# Patient Record
Sex: MALE | Race: OTHER | HISPANIC OR LATINO | Employment: PART TIME | URBAN - METROPOLITAN AREA
[De-identification: names, ages, dates, MRNs, and addresses within clinical notes are randomized per-mention and may not be internally consistent; named-entity substitution may affect disease eponyms.]

---

## 2020-08-18 ENCOUNTER — APPOINTMENT (OUTPATIENT)
Dept: LAB | Facility: HOSPITAL | Age: 35
End: 2020-08-18
Payer: COMMERCIAL

## 2020-08-18 ENCOUNTER — OFFICE VISIT (OUTPATIENT)
Dept: FAMILY MEDICINE CLINIC | Facility: CLINIC | Age: 35
End: 2020-08-18
Payer: COMMERCIAL

## 2020-08-18 VITALS
TEMPERATURE: 97.2 F | BODY MASS INDEX: 26.34 KG/M2 | SYSTOLIC BLOOD PRESSURE: 118 MMHG | HEIGHT: 70 IN | HEART RATE: 82 BPM | DIASTOLIC BLOOD PRESSURE: 70 MMHG | OXYGEN SATURATION: 97 % | RESPIRATION RATE: 17 BRPM | WEIGHT: 184 LBS

## 2020-08-18 DIAGNOSIS — Z02.1 PRE-EMPLOYMENT HEALTH SCREENING EXAMINATION: ICD-10-CM

## 2020-08-18 DIAGNOSIS — Z02.1 PRE-EMPLOYMENT HEALTH SCREENING EXAMINATION: Primary | ICD-10-CM

## 2020-08-18 PROCEDURE — 36415 COLL VENOUS BLD VENIPUNCTURE: CPT

## 2020-08-18 PROCEDURE — 86480 TB TEST CELL IMMUN MEASURE: CPT

## 2020-08-18 PROCEDURE — 3008F BODY MASS INDEX DOCD: CPT | Performed by: INTERNAL MEDICINE

## 2020-08-18 PROCEDURE — 1036F TOBACCO NON-USER: CPT | Performed by: INTERNAL MEDICINE

## 2020-08-18 PROCEDURE — 99204 OFFICE O/P NEW MOD 45 MIN: CPT | Performed by: INTERNAL MEDICINE

## 2020-08-18 PROCEDURE — 86787 VARICELLA-ZOSTER ANTIBODY: CPT

## 2020-08-20 LAB
GAMMA INTERFERON BACKGROUND BLD IA-ACNC: 0.04 IU/ML
M TB IFN-G BLD-IMP: NEGATIVE
M TB IFN-G CD4+ BCKGRND COR BLD-ACNC: 0.08 IU/ML
M TB IFN-G CD4+ BCKGRND COR BLD-ACNC: 0.12 IU/ML
MITOGEN IGNF BCKGRD COR BLD-ACNC: >10 IU/ML
VZV IGG SER IA-ACNC: NORMAL

## 2020-08-31 ENCOUNTER — TELEPHONE (OUTPATIENT)
Dept: FAMILY MEDICINE CLINIC | Facility: CLINIC | Age: 35
End: 2020-08-31

## 2021-09-25 ENCOUNTER — IMMUNIZATIONS (OUTPATIENT)
Dept: FAMILY MEDICINE CLINIC | Facility: HOSPITAL | Age: 36
End: 2021-09-25

## 2021-09-25 DIAGNOSIS — Z23 ENCOUNTER FOR IMMUNIZATION: Primary | ICD-10-CM

## 2021-09-25 PROCEDURE — 91300 SARS-COV-2 / COVID-19 MRNA VACCINE (PFIZER-BIONTECH) 30 MCG: CPT

## 2021-09-25 PROCEDURE — 0001A SARS-COV-2 / COVID-19 MRNA VACCINE (PFIZER-BIONTECH) 30 MCG: CPT

## 2021-12-08 ENCOUNTER — CONSULT (OUTPATIENT)
Dept: PULMONOLOGY | Facility: CLINIC | Age: 36
End: 2021-12-08
Payer: COMMERCIAL

## 2021-12-08 VITALS
HEART RATE: 78 BPM | DIASTOLIC BLOOD PRESSURE: 78 MMHG | WEIGHT: 176.25 LBS | OXYGEN SATURATION: 98 % | SYSTOLIC BLOOD PRESSURE: 104 MMHG | HEIGHT: 70 IN | BODY MASS INDEX: 25.23 KG/M2 | TEMPERATURE: 97.6 F

## 2021-12-08 DIAGNOSIS — J45.20 MILD INTERMITTENT ASTHMA WITHOUT COMPLICATION: Primary | ICD-10-CM

## 2021-12-08 PROCEDURE — 99244 OFF/OP CNSLTJ NEW/EST MOD 40: CPT | Performed by: INTERNAL MEDICINE

## 2021-12-08 RX ORDER — ALBUTEROL SULFATE 90 UG/1
2 AEROSOL, METERED RESPIRATORY (INHALATION) EVERY 6 HOURS PRN
COMMUNITY

## 2021-12-30 ENCOUNTER — TELEPHONE (OUTPATIENT)
Dept: FAMILY MEDICINE CLINIC | Facility: CLINIC | Age: 36
End: 2021-12-30

## 2021-12-30 DIAGNOSIS — R68.83 CHILLS: ICD-10-CM

## 2021-12-30 DIAGNOSIS — R52 BODY ACHES: ICD-10-CM

## 2021-12-30 DIAGNOSIS — Z20.822 EXPOSURE TO COVID-19 VIRUS: ICD-10-CM

## 2021-12-30 DIAGNOSIS — U07.1 COVID-19: ICD-10-CM

## 2021-12-30 DIAGNOSIS — J02.9 SORETHROAT: Primary | ICD-10-CM

## 2021-12-30 DIAGNOSIS — R05.9 COUGH: ICD-10-CM

## 2022-01-05 ENCOUNTER — TELEPHONE (OUTPATIENT)
Dept: FAMILY MEDICINE CLINIC | Facility: CLINIC | Age: 37
End: 2022-01-05

## 2022-01-05 NOTE — TELEPHONE ENCOUNTER
Pt left a VM stating he is positive for covid  But Pt was tested at a [facility] so we are aware

## 2022-02-01 ENCOUNTER — HOSPITAL ENCOUNTER (OUTPATIENT)
Dept: PULMONOLOGY | Facility: HOSPITAL | Age: 37
Discharge: HOME | End: 2022-02-01
Attending: INTERNAL MEDICINE
Payer: COMMERCIAL

## 2022-02-01 DIAGNOSIS — J45.20 MILD INTERMITTENT ASTHMA WITHOUT COMPLICATION: ICD-10-CM

## 2022-02-01 PROCEDURE — 94060 EVALUATION OF WHEEZING: CPT

## 2022-02-01 PROCEDURE — 94060 EVALUATION OF WHEEZING: CPT | Performed by: INTERNAL MEDICINE

## 2022-02-01 PROCEDURE — 94726 PLETHYSMOGRAPHY LUNG VOLUMES: CPT | Performed by: INTERNAL MEDICINE

## 2022-02-01 PROCEDURE — 94729 DIFFUSING CAPACITY: CPT | Performed by: INTERNAL MEDICINE

## 2022-02-01 PROCEDURE — 94729 DIFFUSING CAPACITY: CPT

## 2022-02-01 PROCEDURE — 94726 PLETHYSMOGRAPHY LUNG VOLUMES: CPT

## 2022-02-01 PROCEDURE — 94760 N-INVAS EAR/PLS OXIMETRY 1: CPT

## 2022-02-01 RX ORDER — ALBUTEROL SULFATE 2.5 MG/3ML
SOLUTION RESPIRATORY (INHALATION)
Status: COMPLETED
Start: 2022-02-01 | End: 2022-02-01

## 2022-02-01 RX ORDER — ALBUTEROL SULFATE 2.5 MG/3ML
2.5 SOLUTION RESPIRATORY (INHALATION) ONCE
Status: COMPLETED | OUTPATIENT
Start: 2022-02-01 | End: 2022-02-01

## 2022-02-01 RX ADMIN — ALBUTEROL SULFATE 2.5 MG: 2.5 SOLUTION RESPIRATORY (INHALATION) at 08:36

## 2022-02-16 DIAGNOSIS — J45.20 MILD INTERMITTENT ASTHMA WITHOUT COMPLICATION: Primary | ICD-10-CM

## 2022-03-07 ENCOUNTER — OFFICE VISIT (OUTPATIENT)
Dept: FAMILY MEDICINE CLINIC | Facility: CLINIC | Age: 37
End: 2022-03-07
Payer: COMMERCIAL

## 2022-03-07 VITALS
TEMPERATURE: 98.2 F | HEIGHT: 70 IN | HEART RATE: 78 BPM | WEIGHT: 180 LBS | OXYGEN SATURATION: 97 % | BODY MASS INDEX: 25.77 KG/M2 | DIASTOLIC BLOOD PRESSURE: 62 MMHG | RESPIRATION RATE: 16 BRPM | SYSTOLIC BLOOD PRESSURE: 116 MMHG

## 2022-03-07 DIAGNOSIS — Z01.84 ENCOUNTER FOR IMMUNOLOGICAL TEST: ICD-10-CM

## 2022-03-07 DIAGNOSIS — Z00.00 ANNUAL PHYSICAL EXAM: Primary | ICD-10-CM

## 2022-03-07 DIAGNOSIS — E66.3 OVERWEIGHT WITH BODY MASS INDEX (BMI) OF 25 TO 25.9 IN ADULT: ICD-10-CM

## 2022-03-07 DIAGNOSIS — Z02.1 PRE-EMPLOYMENT HEALTH SCREENING EXAMINATION: ICD-10-CM

## 2022-03-07 PROCEDURE — 99395 PREV VISIT EST AGE 18-39: CPT | Performed by: FAMILY MEDICINE

## 2022-03-07 PROCEDURE — 3725F SCREEN DEPRESSION PERFORMED: CPT | Performed by: FAMILY MEDICINE

## 2022-03-07 PROCEDURE — 1036F TOBACCO NON-USER: CPT | Performed by: FAMILY MEDICINE

## 2022-03-07 PROCEDURE — 3008F BODY MASS INDEX DOCD: CPT | Performed by: FAMILY MEDICINE

## 2022-03-07 PROCEDURE — 87070 CULTURE OTHR SPECIMN AEROBIC: CPT | Performed by: FAMILY MEDICINE

## 2022-03-07 NOTE — PROGRESS NOTES
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 [place] which is ordered  Free of communicable diseases  History of chicken pox , varicella titres immune in 2020, no repeat testing indicated  Immunizations and preventive care screenings were discussed with patient today  Appropriate education was printed on patient's after visit summary  Counseling:  Alcohol/drug use: discussed moderation in alcohol intake, the recommendations for healthy alcohol use, and avoidance of illicit drug use  Dental Health: discussed importance of regular tooth brushing, flossing, and dental visits  Injury prevention: discussed safety/seat belts, safety helmets, smoke detectors, carbon dioxide detectors, and smoking near bedding or upholstery  · Exercise: the importance of regular exercise/physical activity was discussed  Recommend exercise 3-5 times per week for at least 30 minutes  BMI Counseling: Body mass index is 25 83 kg/m²  The BMI is above normal  Nutrition recommendations include decreasing portion sizes, encouraging healthy choices of fruits and vegetables, decreasing fast food intake, consuming healthier snacks, limiting drinks that contain sugar, moderation in carbohydrate intake and reducing intake of cholesterol  Exercise recommendations include moderate physical activity 150 minutes/week  No pharmacotherapy was ordered  Rationale for BMI follow-up plan is due to patient being overweight or obese  Depression Screening and Follow-up Plan: Patient was screened for depression during today's encounter  They screened negative with a PHQ-2 score of 2  No follow-ups on file  Chief Complaint:     Chief Complaint   Patient presents with    Physical Exam      History of Present Illness:     Adult Annual Physical   Patient here for a comprehensive physical exam  The patient reports no problems  Diet and Physical Activity  · Diet/Nutrition: well balanced diet, limited junk food and consuming 3-5 servings of fruits/vegetables daily  · Exercise: moderate cardiovascular exercise  Depression Screening  PHQ-2/9 Depression Screening    Little interest or pleasure in doing things: 2 - more than half the days  Feeling down, depressed, or hopeless: 0 - not at all  PHQ-2 Score: 2  PHQ-2 Interpretation: Negative depression screen       General Health  · Sleep: sleeps well  · Hearing: grossly normal   · Vision: no vision problems and goes for regular eye exams  · Dental: regular dental visits  [facility]  · History of STDs?: no      Review of Systems:     Review of Systems   Constitutional: Negative for chills and fever  HENT: Negative for congestion, rhinorrhea and sore throat  Eyes: Negative for discharge, redness and itching  Respiratory: Negative for chest tightness, shortness of breath and wheezing  Cardiovascular: Negative for chest pain and palpitations  Gastrointestinal: Negative for abdominal pain, constipation and diarrhea  Genitourinary: Negative for dysuria  Skin: Negative for pallor and rash  Neurological: Negative for dizziness, weakness, numbness and headaches  Past Medical History:     Past Medical History:   Diagnosis Date    Asthma     Last episode was 2009, when I was in college  Past Surgical History:     History reviewed  No pertinent surgical history     Social History: Social History     Socioeconomic History    Marital status: /Civil Union     Spouse name: None    Number of children: None    Years of education: None    Highest education level: None   Occupational History    None   Tobacco Use    Smoking status: Never Smoker    Smokeless tobacco: Never Used   Vaping Use    Vaping Use: Never used   Substance and Sexual Activity    Alcohol use: Never     Alcohol/week: 1 0 standard drink     Types: 1 Cans of beer per week     Comment: soc    Drug use: Never    Sexual activity: Yes     Partners: Female     Birth control/protection: I U D  Other Topics Concern    None   Social History Narrative    None     Social Determinants of Health     Financial Resource Strain: Not on file   Food Insecurity: Not on file   Transportation Needs: Not on file   Physical Activity: Not on file   Stress: Not on file   Social Connections: Not on file   Intimate Partner Violence: Not on file   Housing Stability: Not on file      Family History:     Family History   Problem Relation Age of Onset    Hypertension Mother     Diabetes Father         Diabetes      Current Medications:     Current Outpatient Medications   Medication Sig Dispense Refill    albuterol (PROVENTIL HFA,VENTOLIN HFA) 90 mcg/act inhaler Inhale 2 puffs every 6 (six) hours as needed for wheezing       No current facility-administered medications for this visit  Allergies:     No Known Allergies   Physical Exam:     /62 (BP Location: Left arm, Patient Position: Sitting, Cuff Size: Adult)   Pulse 78   Temp 98 2 °F (36 8 °C) (Temporal)   Resp 16   Ht 5' 10" (1 778 m)   Wt 81 6 kg (180 lb)   SpO2 97%   BMI 25 83 kg/m²     Physical Exam  Vitals and nursing note reviewed  Constitutional:       General: He is not in acute distress  Appearance: Normal appearance  He is well-developed and normal weight  He is not ill-appearing or toxic-appearing  HENT:      Head: Normocephalic and atraumatic  Right Ear: Tympanic membrane, ear canal and external ear normal       Left Ear: Tympanic membrane, ear canal and external ear normal       Nose: No mucosal edema or rhinorrhea  Mouth/Throat:      Mouth: Mucous membranes are not pale, dry and not cyanotic  Pharynx: Oropharynx is clear  No oropharyngeal exudate or posterior oropharyngeal erythema  Eyes:      General: No scleral icterus  Right eye: No discharge  Left eye: No discharge  Extraocular Movements: Extraocular movements intact  Conjunctiva/sclera: Conjunctivae normal       Pupils: Pupils are equal, round, and reactive to light  Cardiovascular:      Rate and Rhythm: Normal rate and regular rhythm  Heart sounds: Normal heart sounds  No murmur heard  No gallop  Pulmonary:      Effort: Pulmonary effort is normal  No respiratory distress  Breath sounds: Normal breath sounds  No wheezing or rales  Abdominal:      General: Abdomen is flat  Palpations: Abdomen is soft  Tenderness: There is no abdominal tenderness  Musculoskeletal:         General: No swelling, tenderness, deformity or signs of injury  Cervical back: Normal range of motion  Right lower leg: No edema  Left lower leg: No edema  Skin:     General: Skin is warm  Coloration: Skin is not jaundiced or pale  Findings: No rash  Neurological:      General: No focal deficit present  Mental Status: He is alert and oriented to person, place, and time  Psychiatric:         Mood and Affect: Mood normal          Behavior: Behavior normal          Thought Content:  Thought content normal          Judgment: Judgment normal           Geeta Bruno MD   [address] [place] [address]

## 2022-03-07 NOTE — PATIENT INSTRUCTIONS

## 2022-03-10 LAB — BACTERIA THROAT CULT: NORMAL

## 2022-05-24 ENCOUNTER — OFFICE VISIT (OUTPATIENT)
Dept: FAMILY MEDICINE CLINIC | Facility: CLINIC | Age: 37
End: 2022-05-24
Payer: COMMERCIAL

## 2022-05-24 ENCOUNTER — APPOINTMENT (OUTPATIENT)
Dept: LAB | Facility: HOSPITAL | Age: 37
End: 2022-05-24
Payer: COMMERCIAL

## 2022-05-24 VITALS
RESPIRATION RATE: 16 BRPM | TEMPERATURE: 102.5 F | DIASTOLIC BLOOD PRESSURE: 60 MMHG | HEIGHT: 70 IN | BODY MASS INDEX: 27.23 KG/M2 | OXYGEN SATURATION: 98 % | HEART RATE: 114 BPM | SYSTOLIC BLOOD PRESSURE: 108 MMHG | WEIGHT: 190.2 LBS

## 2022-05-24 DIAGNOSIS — J35.1 SWOLLEN TONSIL: ICD-10-CM

## 2022-05-24 DIAGNOSIS — A92.9: ICD-10-CM

## 2022-05-24 DIAGNOSIS — A92.9: Primary | ICD-10-CM

## 2022-05-24 DIAGNOSIS — T63.624A: ICD-10-CM

## 2022-05-24 DIAGNOSIS — Z13.89 SCREENING FOR BLOOD OR PROTEIN IN URINE: ICD-10-CM

## 2022-05-24 DIAGNOSIS — B34.9 VIRAL INFECTION, UNSPECIFIED: ICD-10-CM

## 2022-05-24 PROBLEM — T63.621A: Status: ACTIVE | Noted: 2022-05-24

## 2022-05-24 LAB
ALBUMIN SERPL BCP-MCNC: 4.1 G/DL (ref 3.5–5)
ALP SERPL-CCNC: 47 U/L (ref 34–104)
ALT SERPL W P-5'-P-CCNC: 14 U/L (ref 7–52)
ANION GAP SERPL CALCULATED.3IONS-SCNC: 7 MMOL/L (ref 4–13)
AST SERPL W P-5'-P-CCNC: 18 U/L (ref 13–39)
BASOPHILS # BLD AUTO: 0.02 THOUSANDS/ΜL (ref 0–0.1)
BASOPHILS NFR BLD AUTO: 0 % (ref 0–1)
BILIRUB SERPL-MCNC: 0.56 MG/DL (ref 0.2–1)
BUN SERPL-MCNC: 12 MG/DL (ref 5–25)
CALCIUM SERPL-MCNC: 9 MG/DL (ref 8.4–10.2)
CHLORIDE SERPL-SCNC: 99 MMOL/L (ref 96–108)
CO2 SERPL-SCNC: 30 MMOL/L (ref 21–32)
CREAT SERPL-MCNC: 1.08 MG/DL (ref 0.6–1.3)
EOSINOPHIL # BLD AUTO: 0.03 THOUSAND/ΜL (ref 0–0.61)
EOSINOPHIL NFR BLD AUTO: 0 % (ref 0–6)
ERYTHROCYTE [DISTWIDTH] IN BLOOD BY AUTOMATED COUNT: 12.2 % (ref 11.6–15.1)
GFR SERPL CREATININE-BSD FRML MDRD: 88 ML/MIN/1.73SQ M
GLUCOSE P FAST SERPL-MCNC: 125 MG/DL (ref 65–99)
HCT VFR BLD AUTO: 42.8 % (ref 36.5–49.3)
HGB BLD-MCNC: 14.8 G/DL (ref 12–17)
IMM GRANULOCYTES # BLD AUTO: 0.03 THOUSAND/UL (ref 0–0.2)
IMM GRANULOCYTES NFR BLD AUTO: 0 % (ref 0–2)
LYMPHOCYTES # BLD AUTO: 0.36 THOUSANDS/ΜL (ref 0.6–4.47)
LYMPHOCYTES NFR BLD AUTO: 5 % (ref 14–44)
MCH RBC QN AUTO: 33.3 PG (ref 26.8–34.3)
MCHC RBC AUTO-ENTMCNC: 34.6 G/DL (ref 31.4–37.4)
MCV RBC AUTO: 96 FL (ref 82–98)
MONOCYTES # BLD AUTO: 0.78 THOUSAND/ΜL (ref 0.17–1.22)
MONOCYTES NFR BLD AUTO: 11 % (ref 4–12)
NEUTROPHILS # BLD AUTO: 5.86 THOUSANDS/ΜL (ref 1.85–7.62)
NEUTS SEG NFR BLD AUTO: 84 % (ref 43–75)
NRBC BLD AUTO-RTO: 0 /100 WBCS
PLATELET # BLD AUTO: 152 THOUSANDS/UL (ref 149–390)
PMV BLD AUTO: 9.8 FL (ref 8.9–12.7)
POTASSIUM SERPL-SCNC: 3.8 MMOL/L (ref 3.5–5.3)
PROT SERPL-MCNC: 7 G/DL (ref 6.4–8.4)
RBC # BLD AUTO: 4.44 MILLION/UL (ref 3.88–5.62)
S PYO AG THROAT QL: NEGATIVE
SODIUM SERPL-SCNC: 136 MMOL/L (ref 135–147)
WBC # BLD AUTO: 7.08 THOUSAND/UL (ref 4.31–10.16)

## 2022-05-24 PROCEDURE — 36415 COLL VENOUS BLD VENIPUNCTURE: CPT

## 2022-05-24 PROCEDURE — 85025 COMPLETE CBC W/AUTO DIFF WBC: CPT

## 2022-05-24 PROCEDURE — 87636 SARSCOV2 & INF A&B AMP PRB: CPT | Performed by: NURSE PRACTITIONER

## 2022-05-24 PROCEDURE — 80053 COMPREHEN METABOLIC PANEL: CPT

## 2022-05-24 PROCEDURE — 99215 OFFICE O/P EST HI 40 MIN: CPT | Performed by: NURSE PRACTITIONER

## 2022-05-24 PROCEDURE — 87880 STREP A ASSAY W/OPTIC: CPT | Performed by: NURSE PRACTITIONER

## 2022-05-24 PROCEDURE — 86794 ZIKA VIRUS IGM ANTIBODY: CPT

## 2022-05-24 NOTE — PROGRESS NOTES
BMI Counseling: Body mass index is 27 29 kg/m²  The BMI is above normal  Nutrition recommendations include decreasing portion sizes, encouraging healthy choices of fruits and vegetables, decreasing fast food intake, consuming healthier snacks, limiting drinks that contain sugar, moderation in carbohydrate intake, increasing intake of lean protein, reducing intake of saturated and trans fat and reducing intake of cholesterol  Exercise recommendations include vigorous physical activity 75 minutes/week, exercising 3-5 times per week, obtaining a gym membership and strength training exercises  No pharmacotherapy was ordered  Rationale for BMI follow-up plan is due to patient being overweight or obese  Assessment/Plan:         Problem List Items Addressed This Visit        Other    Viral infection, unspecified - Primary     Patient recently was in [facility] and had multiple mosquito bites at the time  He reports that neighbors down the street also had dengue fever  Due to concerns a viral infection in the particular area currently ordered for dengue fever AB IgM and Zika virus Ab IgM GIACOMO Norris  Routine lab work ordered for CBC with diff and comprehensive metabolic panel  COVID-19 testing and flu testing ordered  Infectious disease consult ordered at this time  Relevant Orders    Covid/Flu- Office Collect    Swollen tonsil     Rapid strep test negative at this time  Patient noted to have swollen glands without any pain  Relevant Orders    POCT rapid strepA (Completed)    Toxic effect of sting of jelly fish     Patient noted to have a jellyfish sting to his forehead 3 weeks prior  Patient indicated no symptoms prior to Friday last week  Infectious disease consult ordered at this time  Relevant Orders    Ambulatory Referral to Infectious Disease    Screening for blood or protein in urine     Patient ordered for screening for blood and protein urine             Relevant Orders UA w Reflex to Microscopic w Reflex to Culture            Subjective:      Patient ID: Yessica Armstrong is a 29 y o  male  Patient is a 29year old male that reports Fever (100 2 was the highest), Chills , Rash (Was in [place] and they have a lot of mosquitos and he is thinking that maybe it is possible dengue fever) and has Muscle Pain  Patient had a home test for covid (Covid Negative)  He does report that he had a jelly fish bite to his forehead in 2 spots 3 weeks prior  Reports multiple mosquito bites when in [place] with noted high incidence of mosquito bites while in [place]           The following portions of the patient's history were reviewed and updated as appropriate:   Past Medical History:  He has a past medical history of Asthma ,  _______________________________________________________________________  Medical Problems:  does not have any pertinent problems on file ,  _______________________________________________________________________  Past Surgical History:   has no past surgical history on file ,  _______________________________________________________________________  Family History:  family history includes Diabetes in his father; Hypertension in his mother ,  _______________________________________________________________________  Social History:   reports that he has never smoked  He has never used smokeless tobacco  He reports that he does not drink alcohol and does not use drugs  ,  _______________________________________________________________________  Allergies:  has No Known Allergies     _______________________________________________________________________  Current Outpatient Medications   Medication Sig Dispense Refill    albuterol (PROVENTIL HFA,VENTOLIN HFA) 90 mcg/act inhaler Inhale 2 puffs every 6 (six) hours as needed for wheezing       No current facility-administered medications for this visit  _______________________________________________________________________  Review of Systems   Constitutional: Positive for chills and fever  Negative for activity change, appetite change, fatigue and unexpected weight change  HENT: Positive for sore throat  Negative for congestion, ear discharge, ear pain, nosebleeds, postnasal drip, rhinorrhea, sinus pressure, sinus pain, sneezing and voice change  Eyes: Positive for pain and redness  Negative for visual disturbance  Respiratory: Negative for cough, chest tightness, shortness of breath and wheezing  Cardiovascular: Negative for chest pain and palpitations  Gastrointestinal: Negative for abdominal distention, abdominal pain, constipation, diarrhea (urine), nausea and vomiting  Endocrine: Negative  Genitourinary: Negative for difficulty urinating, dysuria, flank pain, frequency, hematuria and urgency  Musculoskeletal: Positive for myalgias  Negative for arthralgias  Skin: Positive for wound  Jelly fish bite to fore head x 2  Rash on chest and upper arms  Allergic/Immunologic: Negative  Neurological: Positive for headaches  Negative for dizziness, seizures, weakness, light-headedness and numbness  Hematological: Negative  Psychiatric/Behavioral: Negative  Objective:  Vitals:    05/24/22 1409   BP: 108/60   BP Location: Left arm   Patient Position: Sitting   Cuff Size: Adult   Pulse: (!) 114   Resp: 16   Temp: (!) 102 5 °F (39 2 °C)   TempSrc: Tympanic   SpO2: 98%   Weight: 86 3 kg (190 lb 3 2 oz)   Height: 5' 10" (1 778 m)     Body mass index is 27 29 kg/m²  Physical Exam  Vitals and nursing note reviewed  Constitutional:       Appearance: Normal appearance  He is well-developed  Comments: Overweight  HENT:      Head: Normocephalic and atraumatic        Right Ear: Tympanic membrane, ear canal and external ear normal       Left Ear: Tympanic membrane, ear canal and external ear normal       Nose: Nose normal  No congestion or rhinorrhea  Mouth/Throat:      Mouth: Mucous membranes are moist       Pharynx: No oropharyngeal exudate or posterior oropharyngeal erythema  Comments: Throat swollen reddened without any pain  Eyes:      Extraocular Movements: Extraocular movements intact  Conjunctiva/sclera: Conjunctivae normal       Pupils: Pupils are equal, round, and reactive to light  Comments: Eyes reddened and infused  Neck:      Vascular: No carotid bruit  Cardiovascular:      Rate and Rhythm: Normal rate and regular rhythm  Pulses: Normal pulses  Heart sounds: Normal heart sounds  No murmur heard  Pulmonary:      Effort: Pulmonary effort is normal       Breath sounds: Normal breath sounds  Abdominal:      General: Bowel sounds are normal       Palpations: Abdomen is soft  Musculoskeletal:         General: Normal range of motion  Cervical back: Normal range of motion and neck supple  No rigidity or tenderness  Lymphadenopathy:      Cervical: No cervical adenopathy  Skin:     General: Skin is warm  Findings: Erythema and rash present  Comments: Reddened rash to chest back and upper arms  Neurological:      General: No focal deficit present  Mental Status: He is alert and oriented to person, place, and time     Psychiatric:         Mood and Affect: Mood normal          Behavior: Behavior normal

## 2022-05-24 NOTE — PATIENT INSTRUCTIONS
Dengue   WHAT YOU NEED TO KNOW:   What is dengue? Dengue is a disease caused by 4 different viruses  Infection by one virus does not provide immunity against the other 3  This means you can get dengue up to 4 times  A dengue virus may cause a mild to severe infection  What are the signs and symptoms of dengue? You may not have symptoms  Any of the following may develop 4 to 10 days after infection, usually lasting up to 7 days:  Fever and chills    Nausea, vomiting, or abdominal pain    Headache, eye pain, or joint and muscle pain    Feeling tired, weak, or restless    Flushed, warm skin, or a rash    How is dengue diagnosed and treated? Your healthcare provider will examine you and ask about your symptoms  Tell him or her if you have recently traveled  You will need blood tests to check for infection and measure your blood cell levels  Dengue cannot be cured  You may need the following:  Acetaminophen  decreases pain and fever  It is available without a doctor's order  Ask how much to take and how often to take it  Follow directions  Read the labels of all other medicines you are using to see if they also contain acetaminophen, or ask your doctor or pharmacist  Acetaminophen can cause liver damage if not taken correctly  Do not use more than 4 grams (4,000 milligrams) total of acetaminophen in one day  You may need treatment in a hospital for severe symptoms  You may need to be monitored closely for problems such as bleeding and low blood pressure  You may need IV fluids to treat dehydration and increase your blood pressure  You may need a blood transfusion if dengue causes severe bleeding  What can I do to manage dengue? Do not take NSAIDs or aspirin  These medicines can increase your risk for bleeding  Acetaminophen  decreases pain and fever  It is available without a doctor's order  Ask how much to take and how often to take it  Follow directions   Read the labels of all other medicines you are using to see if they also contain acetaminophen, or ask your doctor or pharmacist  Acetaminophen can cause liver damage if not taken correctly  Do not use more than 4 grams (4,000 milligrams) total of acetaminophen in one day  Drink plenty of fluids to prevent dehydration  You are at risk for dehydration if you have a fever, are vomiting, or have diarrhea  Ask how much liquid to drink each day and which liquids are best for you  You may need to drink an oral rehydration solution (ORS)  This is a drink that contains the right amount of salt, sugar, and minerals in water  It is the best oral liquid for replacing your body fluids  Ask your healthcare provider where you can get an ORS  An ORS can be given in small amounts (about 1 teaspoon at a time) if you or your child is vomiting  If you or your child vomits, wait 30 minutes and try again  Ask healthcare providers how much ORS you or your child needs  Rest as directed  Ask your healthcare provider when you can return to your normal activities  What do I need to know about the dengue vaccine? A vaccine to protect against a dengue virus infection is not generally recommended currently  The vaccine is only recommended for children 9 through 12years old who live in an area where dengue is common  A past dengue virus infection must be confirmed before the vaccine will be given  The vaccine is given as a shot in 3 doses, each 6 months apart  It can be given with other vaccines  How is a dengue virus spread? A dengue virus is most commonly spread to a human through the bite of an infected mosquito  Mosquitoes are usually found near water  Examples include ponds, buckets of water, animal dishes, and flower pots  The number of infected mosquitos may increase during rainy or humid seasons  A dengue virus may be passed from a mother to her unborn baby  How can I help prevent the spread of dengue viruses? Do not travel to areas where a dengue virus is common  Ask your healthcare provider where it is safe to travel  Prevent mosquito bites to decrease the risk that you will spread or get a dengue virus infection:  Apply insect repellent  Ask your healthcare provider which insect repellent is right for you  Follow directions on the insect repellent container  The following is a list of tips for insect repellent use:    Do not apply insect repellent to skin under clothing  Apply sunscreen before you apply insect repellent  Wear insect repellent any time you plan to be outside  Wear insect repellent at all times if you travel or live in a high risk area  Reapply insect repellent as directed  Do not apply insect repellent to a child's hands, eyes, mouth, or open skin  To apply insect repellent to a child's face, first apply it to your hands  Then apply it to the child's face  Do not touch the child's eyes or mouth with your hands  Do not  apply insect repellent to a child younger than 2 months  Instead, dress your child in clothing that covers his or her arms and legs  Wear a long-sleeved shirt and pants  This will protect your skin from mosquito bites  Sleep in a screened-in room  Keep doors closed  Use a mosquito net around your bed  Cover children's cribs, strollers, and baby carriers with mosquito nets  When you travel, choose a place to stay with screens on all windows and doors  Apply insect repellent to clothing and gear  This includes boots, pants, socks, and tents  Do this when you camp, hike, or work outside  You can also buy clothing and gear that comes with insect repellent already on it  Remove containers of water  Examples are animal bowls, buckets of water, and bird baths  Water can attract mosquitos  If possible, remove water from blocked gutters  Keep drinking water covered or in bottles  Call your local emergency number [phone] in the [facility] [facility] or have someone call if:   You have trouble breathing      You faint or lose consciousness  When should I seek immediate care? The following are warning signs that your infection is getting worse: Your heart is beating faster than usual     You urinate less than usual or not at all  You feel confused or anxious  Your skin is pale and feels cold or clammy  You have bruises or small red or purple dots on your skin  You feel weak, dizzy, or faint  You have severe abdominal pain  You cannot stop vomiting or you vomit blood  You have blood in your bowel movements or your bowel movements look like tar  You have irregular or heavy menstrual bleeding  When should I call my doctor? Your symptoms do not improve within 3 days  You are pregnant or think you are pregnant  You have questions or concerns about your condition or care  CARE AGREEMENT:   You have the right to help plan your care  Learn about your health condition and how it may be treated  Discuss treatment options with your healthcare providers to decide what care you want to receive  You always have the right to refuse treatment  The above information is an  only  It is not intended as medical advice for individual conditions or treatments  Talk to your doctor, nurse or pharmacist before following any medical regimen to see if it is safe and effective for you  © Copyright [employer] 2022 Information is for End User's use only and may not be sold, redistributed or otherwise used for commercial purposes   All illustrations and images included in CareNotes® are the copyrighted property of [employer]  or [address] [place] [address] [employer]

## 2022-05-24 NOTE — ASSESSMENT & PLAN NOTE
Patient noted to have a jellyfish sting to his forehead 3 weeks prior  Patient indicated no symptoms prior to Friday last week  Infectious disease consult ordered at this time

## 2022-05-24 NOTE — ASSESSMENT & PLAN NOTE
Patient recently was in [place] and had multiple mosquito bites at the time  He reports that neighbors down the street also had dengue fever  Due to concerns a viral infection in the particular area currently ordered for dengue fever AB IgM and Zika virus Ab IgM GIACOMO Lynn  Routine lab work ordered for CBC with diff and comprehensive metabolic panel  COVID-19 testing and flu testing ordered  Infectious disease consult ordered at this time

## 2022-05-25 LAB
FLUAV RNA RESP QL NAA+PROBE: NEGATIVE
FLUBV RNA RESP QL NAA+PROBE: NEGATIVE
SARS-COV-2 RNA RESP QL NAA+PROBE: NEGATIVE

## 2022-05-26 ENCOUNTER — APPOINTMENT (OUTPATIENT)
Dept: LAB | Facility: HOSPITAL | Age: 37
End: 2022-05-26
Attending: INTERNAL MEDICINE
Payer: COMMERCIAL

## 2022-05-26 DIAGNOSIS — R50.9 FEVER, UNSPECIFIED FEVER CAUSE: Primary | ICD-10-CM

## 2022-05-26 DIAGNOSIS — R50.9 FEVER, UNSPECIFIED FEVER CAUSE: ICD-10-CM

## 2022-05-26 LAB
BILIRUB UR QL STRIP: NEGATIVE
CLARITY UR: CLEAR
COLOR UR: YELLOW
GLUCOSE UR STRIP-MCNC: NEGATIVE MG/DL
HGB UR QL STRIP.AUTO: NEGATIVE
KETONES UR STRIP-MCNC: NEGATIVE MG/DL
LEUKOCYTE ESTERASE UR QL STRIP: NEGATIVE
NITRITE UR QL STRIP: NEGATIVE
PH UR STRIP.AUTO: 6.5 [PH]
PROT UR STRIP-MCNC: NEGATIVE MG/DL
SP GR UR STRIP.AUTO: 1.01 (ref 1–1.03)
UROBILINOGEN UR QL STRIP.AUTO: 1 E.U./DL

## 2022-05-26 PROCEDURE — 36415 COLL VENOUS BLD VENIPUNCTURE: CPT

## 2022-05-26 PROCEDURE — 86663 EPSTEIN-BARR ANTIBODY: CPT

## 2022-05-26 PROCEDURE — 81003 URINALYSIS AUTO W/O SCOPE: CPT | Performed by: NURSE PRACTITIONER

## 2022-05-26 PROCEDURE — 86747 PARVOVIRUS ANTIBODY: CPT

## 2022-05-26 PROCEDURE — 86644 CMV ANTIBODY: CPT

## 2022-05-26 PROCEDURE — 86664 EPSTEIN-BARR NUCLEAR ANTIGEN: CPT

## 2022-05-26 PROCEDURE — 86665 EPSTEIN-BARR CAPSID VCA: CPT

## 2022-05-26 PROCEDURE — 86645 CMV ANTIBODY IGM: CPT

## 2022-05-27 LAB
B19V IGG SER IA-ACNC: 5.4 INDEX (ref 0–0.8)
B19V IGM SER IA-ACNC: 0.1 INDEX (ref 0–0.8)
CMV IGG SERPL IA-ACNC: <0.6 U/ML (ref 0–0.59)
CMV IGM SERPL IA-ACNC: <30 AU/ML (ref 0–29.9)
EBV NA IGG SER IA-ACNC: >600 U/ML (ref 0–17.9)
EBV VCA IGG SER IA-ACNC: 230 U/ML (ref 0–17.9)
EBV VCA IGM SER IA-ACNC: <36 U/ML (ref 0–35.9)
INTERPRETATION: ABNORMAL

## 2022-06-09 LAB — MISCELLANEOUS LAB TEST RESULT: NORMAL

## 2022-06-13 LAB — MISCELLANEOUS LAB TEST RESULT: NORMAL
